# Patient Record
Sex: FEMALE | Race: WHITE
[De-identification: names, ages, dates, MRNs, and addresses within clinical notes are randomized per-mention and may not be internally consistent; named-entity substitution may affect disease eponyms.]

---

## 2022-07-08 ENCOUNTER — HOSPITAL ENCOUNTER (EMERGENCY)
Dept: HOSPITAL 97 - ER | Age: 27
Discharge: HOME | End: 2022-07-08
Payer: COMMERCIAL

## 2022-07-08 VITALS — OXYGEN SATURATION: 100 %

## 2022-07-08 VITALS — DIASTOLIC BLOOD PRESSURE: 53 MMHG | SYSTOLIC BLOOD PRESSURE: 100 MMHG

## 2022-07-08 VITALS — TEMPERATURE: 98.4 F

## 2022-07-08 DIAGNOSIS — R10.13: Primary | ICD-10-CM

## 2022-07-08 LAB
ALBUMIN SERPL BCP-MCNC: 3.5 G/DL (ref 3.4–5)
ALP SERPL-CCNC: 95 U/L (ref 45–117)
ALT SERPL W P-5'-P-CCNC: 27 U/L (ref 12–78)
AST SERPL W P-5'-P-CCNC: 17 U/L (ref 15–37)
BUN BLD-MCNC: 9 MG/DL (ref 7–18)
GLUCOSE SERPLBLD-MCNC: 111 MG/DL (ref 74–106)
HCT VFR BLD CALC: 39.7 % (ref 36–45)
LIPASE SERPL-CCNC: 93 U/L (ref 73–393)
LYMPHOCYTES # SPEC AUTO: 3.4 K/UL (ref 0.7–4.9)
MCV RBC: 89 FL (ref 80–100)
PMV BLD: 8.3 FL (ref 7.6–11.3)
POTASSIUM SERPL-SCNC: 3.2 MMOL/L (ref 3.5–5.1)
RBC # BLD: 4.46 M/UL (ref 3.86–4.86)
SP GR UR: 1.01 (ref 1–1.03)

## 2022-07-08 PROCEDURE — 83690 ASSAY OF LIPASE: CPT

## 2022-07-08 PROCEDURE — 80053 COMPREHEN METABOLIC PANEL: CPT

## 2022-07-08 PROCEDURE — 81003 URINALYSIS AUTO W/O SCOPE: CPT

## 2022-07-08 PROCEDURE — 36415 COLL VENOUS BLD VENIPUNCTURE: CPT

## 2022-07-08 PROCEDURE — 74177 CT ABD & PELVIS W/CONTRAST: CPT

## 2022-07-08 PROCEDURE — 81025 URINE PREGNANCY TEST: CPT

## 2022-07-08 PROCEDURE — 85025 COMPLETE CBC W/AUTO DIFF WBC: CPT

## 2022-07-08 NOTE — EDPHYS
Physician Documentation                                                                           

 [facility]                                                                 

Name: Vanessa Burleson                                                                                

Age: 26 yrs                                                                                       

Sex: Female                                                                                       

: 1995                                                                                   

MRN: [id number]                                                                                   

Arrival Date: 2022                                                                          

Time: 08:59                                                                                       

Account#: [id number]                                                                            

Bed 24                                                                                            

Private MD:                                                                                       

ED Physician Horace Dahl                                                                         

HPI:                                                                                              

                                                                                             

10:36 This 26 yrs old Female presents to ER via Ambulatory with complaints of Epigastric Pain.ms3 

10:36 The patient presents with abdominal pain in the epigastric area. Onset: The             ms3 

      symptoms/episode began/occurred yesterday. The symptoms do not radiate. Associated          

      signs and symptoms: Pertinent negatives: nausea, vomiting, and diarrhea. The symptoms       

      are described as sharp. Modifying factors: The symptoms are alleviated by nothing, the      

      symptoms are aggravated by. Severity of pain: At its worst the pain was moderate in the     

      emergency department the pain is unchanged.                                                 

                                                                                                  

OB/GYN:                                                                                           

10:18 LMP N/A - Birth control method                                                          jl7 

                                                                                                  

Historical:                                                                                       

- Allergies:                                                                                      

10:18 No Known Allergies;                                                                     jl7 

- PMHx:                                                                                           

10:18 Anxiety; Depressive disorder;                                                           jl7 

- PSHx:                                                                                           

10:18 None;                                                                                   jl7 

                                                                                                  

- Immunization history:: Client reports receiving the 2nd dose of the Covid vaccine.              

- Social history:: Smoking status: Patient denies any tobacco usage or history of.                

                                                                                                  

                                                                                                  

ROS:                                                                                              

10:36 Constitutional: Negative for fever, and chills. ENT: Negative for injury, pain, and     ms3 

      discharge, Neck: Negative for injury, pain, and swelling, Cardiovascular: Negative for      

      chest pain, and palpitations. Respiratory: Negative for shortness of breath, cough,         

      wheezing, and pleuritic chest pain.                                                         

10:36 Skin: Negative for injury, rash, and discoloration, Neuro: Negative for headache,           

      weakness, numbness, tingling.                                                               

10:36 Abdomen/GI: Positive for abdominal pain, Negative for nausea, vomiting, and diarrhea.       

                                                                                                  

Exam:                                                                                             

10:36 Constitutional:  This is a well developed, well nourished patient who is awake, alert,  ms3 

      and in no acute distress. Head/Face:  Normocephalic, atraumatic. Neck:  Trachea             

      midline, no cervical lymphadenopathy.  Supple, full range of motion without nuchal          

      rigidity, or vertebral point tenderness.  No Meningismus. Chest/axilla:  Normal chest       

      wall appearance and motion.  Nontender with no deformity.   Cardiovascular:  Regular        

      rate and rhythm with a normal S1 and S2.  No gallops, murmurs, or rubs.  Normal PMI, no     

      JVD.  No pulse deficits. Respiratory:  Lungs have equal breath sounds bilaterally,          

      clear to auscultation and percussion.  No rales, rhonchi or wheezes noted.  No              

      increased work of breathing, no retractions or nasal flaring.                               

10:36 Abdomen/GI: Inspection: abdomen appears normal, Bowel sounds: normal, Palpation:            

      abdomen is soft and non-tender.                                                             

                                                                                                  

Vital Signs:                                                                                      

10:17  / 77; Pulse 93; Resp 17; Temp 98.4; Pulse Ox 100% ; Weight 77.11 kg; Height 5    jl7 

      ft. 2 in. (157.48 cm); Pain 0/10;                                                           

10:39 BP 82 / 49; Pulse 49; Resp 13; Pulse Ox 100% on 2 lpm NC;                               jl7 

11:00  / 67; Pulse 59; Resp 15; Pulse Ox 95% ;                                          jl7 

11:30 BP 99 / 67; Pulse 99; Resp 15; Pulse Ox 93% ;                                           jl7 

12:00 BP 95 / 59; Pulse 70; Resp 15; Pulse Ox 100% ;                                          jl7 

13:48  / 53; Pulse 88; Resp 18; Pulse Ox 100% on R/A;                                   bh1 

10:17 Body Mass Index 31.09 (77.11 kg, 157.48 cm)                                             jl7 

                                                                                                  

MDM:                                                                                              

10:36 Patient medically screened.                                                             ms3 

10:36 Differential diagnosis: gastritis, non-specific abd pain, pancreatitis, Peptic Ulcer    ms3 

      Disease.                                                                                    

13:24 Data reviewed: vital signs, nurses notes, lab test result(s), radiologic studies, and   ms3 

      as a result, I will discharge patient. Counseling: I had a detailed discussion with the     

      patient and/or guardian regarding: the historical points, exam findings, and any            

      diagnostic results supporting the discharge/admit diagnosis, lab results, radiology         

      results, the need for outpatient follow up, to return to the emergency department if        

      symptoms worsen or persist or if there are any questions or concerns that arise at          

      home. ED course: Discussed ct, labs, physical exam findings with patient. Patient to        

      follow-up with primary care physician in 2 to 3 days. Patient understands and agrees        

      with plan. All questions were answered. Return precautions discussed include worsening      

      symptoms, or any other concerns. On reevaluation patient is improved, alert and             

      oriented x4, no apparent distress, nontoxic, ambulatory in emergency department..           

                                                                                                  

                                                                                             

09:04 Order name: CBC with Diff; Complete Time: 11:51                                         ms3 

                                                                                             

09:04 Order name: CMP; Complete Time: 11:51                                                   ms3 

                                                                                             

09:04 Order name: Lipase; Complete Time: 11:51                                                ms3 

                                                                                             

11:21 Order name: CT Abd/Pelvis - IV Contrast Only; Complete Time: 13:02                      ms3 

                                                                                             

11:55 Order name: Urine Pregnancy--Ancillary (enter results); Complete Time: 13:02            dh3 

                                                                                             

11:56 Order name: Urine Dipstick-Ancillary; Complete Time: 13:02                              EDMS

                                                                                             

09:04 Order name: IV Saline Lock; Complete Time: 10:53                                        ms3 

                                                                                             

09:04 Order name: Labs collected and sent; Complete Time: 11:12                               ms3 

                                                                                             

11:21 Order name: Urine Dipstick-Ancillary (obtain specimen); Complete Time: 11:54            ms3 

                                                                                             

11:21 Order name: Urine Pregnancy Test (obtain specimen); Complete Time: 11:55                ms3 

                                                                                                  

Administered Medications:                                                                         

10:50 Drug: NS 0.9% 1000 ml Route: IV; Rate: 1 bolus; Site: left forearm;                     jl7 

12:43 Follow up: IV Status: Completed infusion; IV Intake: 1000ml                             bh1 

10:53 Drug: Pepcid (famotidine) 20 mg Route: IVP; Site: right forearm;                        jl7 

12:26 Follow up: Response: No adverse reaction                                                bh1 

10:54 Drug: Zofran (Ondansetron) 4 mg Route: IVP; Site: left forearm;                         jl7 

12:26 Follow up: Response: No adverse reaction                                                bh1 

11:30 Drug: GI Cocktail with Donnatal - (Phenobarbital-Belladonna 10 ml, Maalox Suspension 30 bh1 

      ml, Lidocaine Liquid 2 % 20 ml) {Note: BY ROSARIO.} Route: PO;                              

12:43 Follow up: Response: No adverse reaction                                                bh1 

                                                                                                  

                                                                                                  

Disposition Summary:                                                                              

22 13:24                                                                                    

Discharge Ordered                                                                                 

      Location: Home                                                                          ms3 

      Condition: Stable                                                                       ms3 

      Diagnosis                                                                                   

        - Upper abdominal pain, unspecified                                                   ms3 

      Followup:                                                                               ms3 

        - With: Private Physician                                                                  

        - When: 2 - 3 days                                                                         

        - Reason: Recheck today's complaints                                                       

      Discharge Instructions:                                                                     

        - Discharge Summary Sheet                                                             ms3 

        - Abdominal Pain, Adult                                                               ms3 

      Forms:                                                                                      

        - Medication Reconciliation Form                                                      ms3 

        - Thank You Letter                                                                    ms3 

        - Antibiotic Education                                                                ms3 

        - Prescription Opioid Use                                                             ms3 

Signatures:                                                                                       

Dispatcher [employer]                           Tom White RN                        RN   jl7                                                  

Horace Dahl,                         DO   ms3                                                  

Alma Quintana RN                      RN   bh1                                                  

                                                                                                  

**************************************************************************************************

## 2022-07-08 NOTE — ER
Nurse's Notes                                                                                     

 [facility]                                                                 

Name: Vanessa Burleson                                                                                

Age: 26 yrs                                                                                       

Sex: Female                                                                                       

: 1995                                                                                   

MRN: [id number]                                                                                   

Arrival Date: 2022                                                                          

Time: 08:59                                                                                       

Account#: [id number]                                                                            

Bed 24                                                                                            

Private MD:                                                                                       

Diagnosis: Upper abdominal pain, unspecified                                                      

                                                                                                  

Presentation:                                                                                     

                                                                                             

10:17 Chief complaint: Patient states: Epigastric pain, sharp, intermittent x 1 day, worse    jl7 

      with movement and palpation, last BM 2 days ago and diarrhea. Coronavirus screen: At        

      this time, the client does not indicate any symptoms associated with coronavirus-19.        

      Ebola Screen: No symptoms or risks identified at this time. Initial Sepsis Screen: Does     

      the patient meet any 2 criteria? No. Patient's initial sepsis screen is negative. Does      

      the patient have a suspected source of infection? No. Patient's initial sepsis screen       

      is negative. Risk Assessment: Do you want to hurt yourself or someone else? Patient         

      reports no desire to harm self or others. Onset of symptoms was 2022 at 12:00.     

10:17 Method Of Arrival: Ambulatory                                                           jl7 

10:17 Acuity: GRETCHEN 3                                                                           jl7 

                                                                                                  

Triage Assessment:                                                                                

10:18 General: Appears in no apparent distress. uncomfortable, Behavior is calm, cooperative, jl7 

      appropriate for age. Pain: Complains of pain in epigastric area Pain does not radiate.      

      Pain currently is 0 out of 10 on a pain scale. at worst was 8 out of 10 on a pain           

      scale. Quality of pain is described as sharp, Pain began 1 day ago. Is intermittent.        

      Cardiovascular: Patient's skin is warm and dry. Respiratory: Airway is patent               

      Respiratory effort is even, unlabored, Respiratory pattern is regular, symmetrical. GI:     

      Reports upper abdominal pain, diarrhea, Patient currently denies nausea, vomiting.          

      Derm: Skin is pink, warm \T\ dry.                                                           

                                                                                                  

OB/GYN:                                                                                           

10:18 LMP N/A - Birth control method                                                          jl7 

                                                                                                  

Historical:                                                                                       

- Allergies:                                                                                      

10:18 No Known Allergies;                                                                     jl7 

- PMHx:                                                                                           

10:18 Anxiety; Depressive disorder;                                                           jl7 

- PSHx:                                                                                           

10:18 None;                                                                                   jl7 

                                                                                                  

- Immunization history:: Client reports receiving the 2nd dose of the Covid vaccine.              

- Social history:: Smoking status: Patient denies any tobacco usage or history of.                

                                                                                                  

                                                                                                  

Screenin:27 Abuse screen: Denies threats or abuse. Nutritional screening: No deficits noted.        [facility] 

      Tuberculosis screening: No symptoms or risk factors identified. Fall Risk None              

      identified.                                                                                 

                                                                                                  

Assessment:                                                                                       

10:39 Reassessment: Pt had syncopal episode in triage while obtaining IV. Dr. Dahl in triage. jl7 

12:27 Reassessment: No changes from previously documented assessment.                         [facility] 

                                                                                                  

Vital Signs:                                                                                      

10:17  / 77; Pulse 93; Resp 17; Temp 98.4; Pulse Ox 100% ; Weight 77.11 kg; Height 5    7 

      ft. 2 in. (157.48 cm); Pain 0/10;                                                           

10:39 BP 82 / 49; Pulse 49; Resp 13; Pulse Ox 100% on 2 lpm NC;                               jl7 

11:00  / 67; Pulse 59; Resp 15; Pulse Ox 95% ;                                          jl7 

11:30 BP 99 / 67; Pulse 99; Resp 15; Pulse Ox 93% ;                                           jl7 

12:00 BP 95 / 59; Pulse 70; Resp 15; Pulse Ox 100% ;                                          jl7 

13:48  / 53; Pulse 88; Resp 18; Pulse Ox 100% on R/A;                                   bh1 

10:17 Body Mass Index 31.09 (77.11 kg, 157.48 cm)                                             7 

                                                                                                  

ED Course:                                                                                        

08:59 Patient arrived in ED.                                                                  am2 

09:04 Horace Dahl DO is Attending Physician.                                                ms3 

10:17 Tom Snell, RN is Primary Nurse.                                                      jl7 

10:18 Triage completed.                                                                       jl7 

10:18 Arm band placed on right wrist.                                                         jl7 

10:43 Placed in gown. Bed in low position. Call light in reach. Side rails up X 1. Door       mb7 

      closed. Noise minimized.                                                                    

11:00 Inserted saline lock: 20 gauge in right antecubital area, using aseptic technique.      [facility] 

      Blood collected.                                                                            

11:01 Initial lab(s) drawn, by me, sent to lab.                                               tp1 

12:03 Urine collected: clean catch specimen, clear.                                           3 

12:26 Alma Quintana, RN is Primary Nurse.                                                    1 

12:27 Patient moved to CT via wheelchair.                                                     1 

12:27 Report received from ROSARIO.                                                           1 

12:27 No provider procedures requiring assistance completed.                                  1 

12:36 CT Abd/Pelvis - IV Contrast Only In Process Unspecified.                                EDMS

13:48 IV discontinued, intact, bleeding controlled, No redness/swelling at site.              [facility] 

                                                                                                  

Administered Medications:                                                                         

10:50 Drug: NS 0.9% 1000 ml Route: IV; Rate: 1 bolus; Site: left forearm;                     jl7 

12:43 Follow up: IV Status: Completed infusion; IV Intake: 1000ml                             1 

10:53 Drug: Pepcid (famotidine) 20 mg Route: IVP; Site: right forearm;                        jl7 

12:26 Follow up: Response: No adverse reaction                                                [facility] 

10:54 Drug: Zofran (Ondansetron) 4 mg Route: IVP; Site: left forearm;                         jl7 

12:26 Follow up: Response: No adverse reaction                                                [facility] 

11:30 Drug: GI Cocktail with Donnatal - (Phenobarbital-Belladonna 10 ml, Maalox Suspension 30 bh1 

      ml, Lidocaine Liquid 2 % 20 ml) {Note: BY ROSARIO.} Route: PO;                              

12:43 Follow up: Response: No adverse reaction                                                [facility] 

                                                                                                  

                                                                                                  

Medication:                                                                                       

12:27 VIS not applicable for this client.                                                     [facility] 

                                                                                                  

Intake:                                                                                           

12:43 IV: 1000ml; Total: 1000ml.                                                              [facility] 

                                                                                                  

Outcome:                                                                                          

13:24 Discharge ordered by MD.                                                                ms3 

13:48 Discharged to home ambulatory.                                                          [facility] 

13:48 Condition: good                                                                             

13:48 Discharge instructions given to patient, Instructed on discharge instructions, follow       

      up and referral plans. Demonstrated understanding of instructions, follow-up care.          

13:48 Patient left the ED.                                                                    [facility] 

                                                                                                  

Signatures:                                                                                       

Dispatcher [employer]                           EDMS                                                 

Tom Snell RN                        RN   jl7                                                  

Liseth Garibay am2                                                  

Alia Feliciano                              3                                                  

Horace Dahl DO DO   ms3                                                  

Summer Morales                              tp1                                                  

Rebecca Graf                               mb7                                                  

Alma Quintana RN                      RN   1                                                  

                                                                                                  

**************************************************************************************************

## 2022-07-08 NOTE — RAD REPORT
EXAM DESCRIPTION:  CT - Abdomen   Pelvis W Contrast - 7/8/2022 12:34 pm

 

CLINICAL HISTORY:  Abdominal pain

 

COMPARISON:  none.

 

TECHNIQUE:  Computed axial tomography of the abdomen pelvis was obtained. 100 cc Isovue-300 was admin
istered intravenously. Oral contrast was not requested which limits evaluation of bowel and appendix

 

All CT scans are performed using dose optimization technique as appropriate and may include automated
 exposure control or mA/KV adjustment according to patient size.

 

FINDINGS:  The liver, spleen, pancreas, adrenal and kidneys appear unremarkable.

 

There is no evidence of diverticulitis.

 

3.9 centimeter low-density structure is present within the right pelvis between the cecum and uterus.
 Small amount of ascites. Appendix is not clearly seen

 

IMPRESSION:  3.9 centimeter low-density structure within the right pelvis between the cecum and uteru
s. It is uncertain if this represents an ovarian cyst, unopacified bowel or probably less likely an a
bnormal appendix. If clinically indicated further evaluation with a CT scan utilizing oral contrast a
nd opacifying the terminal ileum/cecum may be helpful